# Patient Record
Sex: FEMALE | Race: WHITE | Employment: PART TIME | URBAN - METROPOLITAN AREA
[De-identification: names, ages, dates, MRNs, and addresses within clinical notes are randomized per-mention and may not be internally consistent; named-entity substitution may affect disease eponyms.]

---

## 2018-07-06 ENCOUNTER — LAB ENCOUNTER (OUTPATIENT)
Dept: LAB | Age: 33
End: 2018-07-06
Attending: EMERGENCY MEDICINE
Payer: COMMERCIAL

## 2018-07-06 ENCOUNTER — OFFICE VISIT (OUTPATIENT)
Dept: FAMILY MEDICINE CLINIC | Facility: CLINIC | Age: 33
End: 2018-07-06

## 2018-07-06 VITALS
SYSTOLIC BLOOD PRESSURE: 110 MMHG | BODY MASS INDEX: 24.1 KG/M2 | RESPIRATION RATE: 15 BRPM | WEIGHT: 126 LBS | HEIGHT: 60.5 IN | OXYGEN SATURATION: 98 % | DIASTOLIC BLOOD PRESSURE: 82 MMHG | TEMPERATURE: 98 F | HEART RATE: 85 BPM

## 2018-07-06 DIAGNOSIS — Z13.228 SCREENING FOR ENDOCRINE, NUTRITIONAL, METABOLIC AND IMMUNITY DISORDER: ICD-10-CM

## 2018-07-06 DIAGNOSIS — Z23 NEED FOR TDAP VACCINATION: ICD-10-CM

## 2018-07-06 DIAGNOSIS — M79.652 PAIN IN BOTH THIGHS: ICD-10-CM

## 2018-07-06 DIAGNOSIS — R87.618 PAP SMEAR ABNORMALITY OF CERVIX/HUMAN PAPILLOMAVIRUS (HPV) POSITIVE: ICD-10-CM

## 2018-07-06 DIAGNOSIS — Z12.4 SCREENING FOR CERVICAL CANCER: ICD-10-CM

## 2018-07-06 DIAGNOSIS — Z13.21 SCREENING FOR ENDOCRINE, NUTRITIONAL, METABOLIC AND IMMUNITY DISORDER: ICD-10-CM

## 2018-07-06 DIAGNOSIS — M79.651 PAIN IN BOTH THIGHS: ICD-10-CM

## 2018-07-06 DIAGNOSIS — Z13.0 SCREENING FOR ENDOCRINE, NUTRITIONAL, METABOLIC AND IMMUNITY DISORDER: ICD-10-CM

## 2018-07-06 DIAGNOSIS — Z13.29 SCREENING FOR ENDOCRINE, NUTRITIONAL, METABOLIC AND IMMUNITY DISORDER: ICD-10-CM

## 2018-07-06 DIAGNOSIS — R07.89 ATYPICAL CHEST PAIN: ICD-10-CM

## 2018-07-06 DIAGNOSIS — Z00.00 ENCOUNTER FOR ANNUAL PHYSICAL EXAM: Primary | ICD-10-CM

## 2018-07-06 LAB
ALBUMIN SERPL-MCNC: 3.8 G/DL (ref 3.5–4.8)
ALP LIVER SERPL-CCNC: 77 U/L (ref 37–98)
ALT SERPL-CCNC: 20 U/L (ref 14–54)
AST SERPL-CCNC: 14 U/L (ref 15–41)
BASOPHILS # BLD AUTO: 0.06 X10(3) UL (ref 0–0.1)
BASOPHILS NFR BLD AUTO: 1 %
BILIRUB SERPL-MCNC: 0.3 MG/DL (ref 0.1–2)
BILIRUB UR QL STRIP.AUTO: NEGATIVE
BUN BLD-MCNC: 14 MG/DL (ref 8–20)
C-REACTIVE PROTEIN: <0.29 MG/DL (ref ?–1)
CALCIUM BLD-MCNC: 8.6 MG/DL (ref 8.3–10.3)
CHLORIDE: 108 MMOL/L (ref 101–111)
CHOLEST SMN-MCNC: 154 MG/DL (ref ?–200)
CK: 69 IU/L (ref 26–192)
CO2: 26 MMOL/L (ref 22–32)
COLOR UR AUTO: YELLOW
CREAT BLD-MCNC: 0.66 MG/DL (ref 0.55–1.02)
EOSINOPHIL # BLD AUTO: 0.12 X10(3) UL (ref 0–0.3)
EOSINOPHIL NFR BLD AUTO: 2 %
ERYTHROCYTE [DISTWIDTH] IN BLOOD BY AUTOMATED COUNT: 12.6 % (ref 11.5–16)
GLUCOSE BLD-MCNC: 82 MG/DL (ref 70–99)
GLUCOSE UR STRIP.AUTO-MCNC: NEGATIVE MG/DL
HCT VFR BLD AUTO: 37 % (ref 34–50)
HDLC SERPL-MCNC: 65 MG/DL (ref 45–?)
HDLC SERPL: 2.37 {RATIO} (ref ?–4.44)
HGB BLD-MCNC: 12 G/DL (ref 12–16)
IMMATURE GRANULOCYTE COUNT: 0.02 X10(3) UL (ref 0–1)
IMMATURE GRANULOCYTE RATIO %: 0.3 %
KETONES UR STRIP.AUTO-MCNC: NEGATIVE MG/DL
LDLC SERPL CALC-MCNC: 80 MG/DL (ref ?–130)
LEUKOCYTE ESTERASE UR QL STRIP.AUTO: NEGATIVE
LYMPHOCYTES # BLD AUTO: 1.89 X10(3) UL (ref 0.9–4)
LYMPHOCYTES NFR BLD AUTO: 31.3 %
M PROTEIN MFR SERPL ELPH: 7.7 G/DL (ref 6.1–8.3)
MCH RBC QN AUTO: 29.3 PG (ref 27–33.2)
MCHC RBC AUTO-ENTMCNC: 32.4 G/DL (ref 31–37)
MCV RBC AUTO: 90.2 FL (ref 81–100)
MONOCYTES # BLD AUTO: 0.45 X10(3) UL (ref 0.1–1)
MONOCYTES NFR BLD AUTO: 7.5 %
NEUTROPHIL ABS PRELIM: 3.49 X10 (3) UL (ref 1.3–6.7)
NEUTROPHILS # BLD AUTO: 3.49 X10(3) UL (ref 1.3–6.7)
NEUTROPHILS NFR BLD AUTO: 57.9 %
NITRITE UR QL STRIP.AUTO: NEGATIVE
NONHDLC SERPL-MCNC: 89 MG/DL (ref ?–130)
PH UR STRIP.AUTO: 6 [PH] (ref 4.5–8)
PLATELET # BLD AUTO: 255 10(3)UL (ref 150–450)
POTASSIUM SERPL-SCNC: 3.9 MMOL/L (ref 3.6–5.1)
PROT UR STRIP.AUTO-MCNC: NEGATIVE MG/DL
RBC # BLD AUTO: 4.1 X10(6)UL (ref 3.8–5.1)
RBC UR QL AUTO: NEGATIVE
RED CELL DISTRIBUTION WIDTH-SD: 41.5 FL (ref 35.1–46.3)
RHEUMATOID FACT SERPL-ACNC: <10 IU/ML (ref ?–15)
SED RATE-ML: 13 MM/HR (ref 0–25)
SODIUM SERPL-SCNC: 141 MMOL/L (ref 136–144)
SP GR UR STRIP.AUTO: 1.02 (ref 1–1.03)
TRIGL SERPL-MCNC: 47 MG/DL (ref ?–150)
TSI SER-ACNC: 3.67 MIU/ML (ref 0.35–5.5)
URIC ACID: 3.9 MG/DL (ref 2.4–8)
UROBILINOGEN UR STRIP.AUTO-MCNC: <2 MG/DL
VLDLC SERPL CALC-MCNC: 9 MG/DL (ref 5–40)
WBC # BLD AUTO: 6 X10(3) UL (ref 4–13)

## 2018-07-06 PROCEDURE — 86200 CCP ANTIBODY: CPT | Performed by: EMERGENCY MEDICINE

## 2018-07-06 PROCEDURE — 88175 CYTOPATH C/V AUTO FLUID REDO: CPT | Performed by: EMERGENCY MEDICINE

## 2018-07-06 PROCEDURE — 99395 PREV VISIT EST AGE 18-39: CPT | Performed by: EMERGENCY MEDICINE

## 2018-07-06 PROCEDURE — 86038 ANTINUCLEAR ANTIBODIES: CPT | Performed by: EMERGENCY MEDICINE

## 2018-07-06 PROCEDURE — 87624 HPV HI-RISK TYP POOLED RSLT: CPT | Performed by: EMERGENCY MEDICINE

## 2018-07-06 PROCEDURE — 80061 LIPID PANEL: CPT | Performed by: EMERGENCY MEDICINE

## 2018-07-06 PROCEDURE — 85652 RBC SED RATE AUTOMATED: CPT | Performed by: EMERGENCY MEDICINE

## 2018-07-06 PROCEDURE — 36415 COLL VENOUS BLD VENIPUNCTURE: CPT | Performed by: EMERGENCY MEDICINE

## 2018-07-06 PROCEDURE — 90471 IMMUNIZATION ADMIN: CPT | Performed by: EMERGENCY MEDICINE

## 2018-07-06 PROCEDURE — 81003 URINALYSIS AUTO W/O SCOPE: CPT | Performed by: EMERGENCY MEDICINE

## 2018-07-06 PROCEDURE — 80050 GENERAL HEALTH PANEL: CPT | Performed by: EMERGENCY MEDICINE

## 2018-07-06 PROCEDURE — 86431 RHEUMATOID FACTOR QUANT: CPT | Performed by: EMERGENCY MEDICINE

## 2018-07-06 PROCEDURE — 99213 OFFICE O/P EST LOW 20 MIN: CPT | Performed by: EMERGENCY MEDICINE

## 2018-07-06 PROCEDURE — 84550 ASSAY OF BLOOD/URIC ACID: CPT | Performed by: EMERGENCY MEDICINE

## 2018-07-06 PROCEDURE — 90715 TDAP VACCINE 7 YRS/> IM: CPT | Performed by: EMERGENCY MEDICINE

## 2018-07-06 PROCEDURE — 86140 C-REACTIVE PROTEIN: CPT | Performed by: EMERGENCY MEDICINE

## 2018-07-06 PROCEDURE — 82550 ASSAY OF CK (CPK): CPT | Performed by: EMERGENCY MEDICINE

## 2018-07-06 NOTE — PROGRESS NOTES
Brad Riggs is a 28year old female who presents for a complete physical exam.   HPI:     Patient presents with:  Physical: Annual physical and pap        Age: 28    1First day of last menstrual period (or first year of         menstruation, if th you  had your cholesterol level checked  in the past five years? YES       e. Have you had a tetanus shot  the past 10 years? YES 2008       f. Does your house have a working smoke detector? YES        g. Do you have firearms at home? NO        h.  Have prescriptions on file. History reviewed. No pertinent past medical history. History reviewed. No pertinent surgical history.    Family History   Problem Relation Age of Onset   • Diabetes Father    • Cancer Paternal Grandmother      stomach   • Cancer P palpable masses, no axillary LAD, no nipple D/C, drainage or retractions. Abdomen: is soft,NBS, NT/ND with no HSM. No rebound or guarding. No CVA tenderness, no hernias.    exam: Speculum placed and vaginal wall visualizes without abnormalities and Liqu orders for this visit:    Encounter for annual physical exam    Screening for endocrine, nutritional, metabolic and immunity disorder  -     CBC WITH DIFFERENTIAL WITH PLATELET; Future  -     COMP METABOLIC PANEL (14); Future  -     LIPID PANEL;  Future  -

## 2018-07-06 NOTE — PATIENT INSTRUCTIONS
Thank you for choosing [address] [place] [address] Group  To Do:  FOR WAGNER DINERO WILLIAM  1. Await PAP, close follow up with Dr. Waqas Rodriguez  2. Have chest xray done and EKG   3. Have blood tests done after fasting  4. Arrange for holter monitor and 2 d echo  5.  Xray of le years   Chlamydia Sexually active women ages 25 and younger, and women at increased risk for infection Every 3 years if you're at risk or have symptoms   Depression All women in this age group At routine exams   Diabetes mellitus, type 2 Adults with no sym women in this age group who have no record of these infections or vaccines 1 or 2 doses   Meningococcal Women at increased risk for infection – talk with your healthcare provider 1 or more doses   Pneumococcal conjugate vaccine (PCV13) and pneumococcal destini [id number] [address], [address] [place] [address]. All rights reserved. This information is not intended as a substitute for professional medical care. Always follow your healthcare professional's instructions.

## 2018-07-07 ENCOUNTER — HOSPITAL ENCOUNTER (OUTPATIENT)
Dept: GENERAL RADIOLOGY | Age: 33
Discharge: HOME | End: 2018-07-07
Attending: EMERGENCY MEDICINE
Payer: COMMERCIAL

## 2018-07-07 ENCOUNTER — APPOINTMENT (OUTPATIENT)
Dept: LAB | Age: 33
End: 2018-07-07
Attending: EMERGENCY MEDICINE
Payer: COMMERCIAL

## 2018-07-07 DIAGNOSIS — M79.651 PAIN IN BOTH THIGHS: ICD-10-CM

## 2018-07-07 DIAGNOSIS — M79.652 PAIN IN BOTH THIGHS: ICD-10-CM

## 2018-07-07 DIAGNOSIS — R07.89 ATYPICAL CHEST PAIN: ICD-10-CM

## 2018-07-07 LAB — CYCLIC CITRULLINATED PEPTIDE: 11 UNITS

## 2018-07-07 PROCEDURE — 71046 X-RAY EXAM CHEST 2 VIEWS: CPT | Performed by: EMERGENCY MEDICINE

## 2018-07-07 PROCEDURE — 73552 X-RAY EXAM OF FEMUR 2/>: CPT | Performed by: EMERGENCY MEDICINE

## 2018-07-07 PROCEDURE — 93010 ELECTROCARDIOGRAM REPORT: CPT | Performed by: INTERNAL MEDICINE

## 2018-07-07 PROCEDURE — 93005 ELECTROCARDIOGRAM TRACING: CPT

## 2018-07-08 LAB — HPV I/H RISK 1 DNA SPEC QL NAA+PROBE: NEGATIVE

## 2018-07-09 ENCOUNTER — TELEPHONE (OUTPATIENT)
Dept: FAMILY MEDICINE CLINIC | Facility: CLINIC | Age: 33
End: 2018-07-09

## 2018-07-09 LAB
ANA SCREEN: NEGATIVE
ATRIAL RATE: 70 BPM
P AXIS: 59 DEGREES
P-R INTERVAL: 138 MS
Q-T INTERVAL: 398 MS
QRS DURATION: 82 MS
QTC CALCULATION (BEZET): 429 MS
R AXIS: 69 DEGREES
T AXIS: 36 DEGREES
VENTRICULAR RATE: 70 BPM

## 2018-07-09 NOTE — TELEPHONE ENCOUNTER
----- Message from Suma Payton MD sent at 7/9/2018  4:23 PM CDT -----  Normal chest x ray    XR FEMUR MIN 2 VIEWS LEFT (CPT=73552)   Order: [id number]   Status:  Final result   Visible to patient:  No (Not Released) Dx:  Pain in both thighs   Notes re

## 2018-07-09 NOTE — TELEPHONE ENCOUNTER
LVM for pt regarding results and instructions listed below. Pt instructed to call back with any further questions/concerns. Pt informed that we are still waiting on her lab results, that she will be receiving a call once those are received, as well.

## 2018-07-10 PROBLEM — M79.652 PAIN IN BOTH THIGHS: Status: ACTIVE | Noted: 2018-07-10

## 2018-07-10 PROBLEM — M79.651 PAIN IN BOTH THIGHS: Status: ACTIVE | Noted: 2018-07-10

## 2018-07-10 PROBLEM — R87.618 PAP SMEAR ABNORMALITY OF CERVIX/HUMAN PAPILLOMAVIRUS (HPV) POSITIVE: Status: ACTIVE | Noted: 2018-07-10

## 2018-07-13 ENCOUNTER — TELEPHONE (OUTPATIENT)
Dept: FAMILY MEDICINE CLINIC | Facility: CLINIC | Age: 33
End: 2018-07-13

## 2018-07-13 NOTE — TELEPHONE ENCOUNTER
VM left for patient. Advised her of normal/stable blood work and EKG. Encouraged call back with questions or concerns.

## 2018-07-13 NOTE — TELEPHONE ENCOUNTER
VM left for patient. I made her aware that her papsmear results are normal but that given her previous history of abnormal one she  Needs to follow up with OBGYN.  I gave her the contact number for  and encouraged her to call back with any questio

## 2018-07-13 NOTE — TELEPHONE ENCOUNTER
----- Message from Terra Ward MD sent at 7/13/2018  9:31 AM CDT -----  HPV neg  PAP neg  Pls make sure patient follows up with OB for + history of ABN PAP, Further recommendations per OB    Patient previously referred to Joel Beatty

## 2018-07-13 NOTE — TELEPHONE ENCOUNTER
----- Message from Cherry Forman MD sent at 7/12/2018  4:28 PM CDT -----  Stable EKG    Notes recorded by Annie Soto MD on 7/12/2018 at 4:28 PM CDT  All labs stable

## 2018-07-27 ENCOUNTER — OFFICE VISIT (OUTPATIENT)
Dept: FAMILY MEDICINE CLINIC | Facility: CLINIC | Age: 33
End: 2018-07-27
Payer: COMMERCIAL

## 2018-07-27 VITALS
RESPIRATION RATE: 15 BRPM | DIASTOLIC BLOOD PRESSURE: 62 MMHG | BODY MASS INDEX: 24 KG/M2 | WEIGHT: 125 LBS | SYSTOLIC BLOOD PRESSURE: 114 MMHG | OXYGEN SATURATION: 98 % | HEART RATE: 85 BPM

## 2018-07-27 DIAGNOSIS — M79.651 BILATERAL THIGH PAIN: ICD-10-CM

## 2018-07-27 DIAGNOSIS — R10.12 LEFT UPPER QUADRANT PAIN: Primary | ICD-10-CM

## 2018-07-27 DIAGNOSIS — M79.652 BILATERAL THIGH PAIN: ICD-10-CM

## 2018-07-27 PROCEDURE — 99214 OFFICE O/P EST MOD 30 MIN: CPT | Performed by: EMERGENCY MEDICINE

## 2018-07-27 NOTE — PATIENT INSTRUCTIONS
Thank you for choosing [facility]  To Do:  FOR WAGNER THOMAS    · Arrange for physical therapy  · May take OTC Tylenol as needed for thigh pain  [place] diet  Eat regularly and avoid prolonged fasting  Avoid aspirin or Ibuprofen or aleve or na Examples are omeprazole (Prilosec), lansoprazole (Prevacid), pantoprazole (Protonix), rabeprazole (Aciphex), and esomeprazole (Nexium). · Take an antacid 30-60 minutes after eating and at bedtime, but not at the same time as an acid blocker.   · Try not to some basic diet and lifestyle changes. The simple steps outlined below may be all you’ll need to ease discomfort. Watch what you eat  · Avoid fatty foods and spicy foods. · Eat fewer acidic foods, such as citrus and tomato-based foods.  These can increase type of medicine called H2 blockers. These are available over the counter and by prescription (for higher doses). Blocking stomach acid  In more severe cases, your healthcare provider may suggest stronger medicines such as proton pump inhibitors (PPIs).  Jazmin [place] medicines for H pylori infection, take them as directed. Take all of the medicine until it is finished or your healthcare provider tells you to stop, even if you feel better. · Your healthcare provider may recommend avoiding NSAIDs.  If you take daily aspi If tests show that H. pylori are in your stomach lining, antibiotics may be prescribed. H.pylori are a type of bacteria that can cause gastritis. Avoiding certain things  Be sure to avoid:  · Aspirin.  Avoid taking aspirin and other anti-inflammatory medic

## 2018-07-27 NOTE — PROGRESS NOTES
Chief Complaint:   Patient presents with:  Lab Results: Follow up     HPI:   This is a 28year old female     FOLLOW UP LABS  Worked up for atypical CP. States that the pain is better. Pain only occurs once a month and resolves spontabeously.  Chest Xray body mass index is 24.01 kg/m² as calculated from the following:    Height as of 7/6/18: 60.5\". Weight as of this encounter: 125 lb. Vital signs reviewed. Appears stated age, well groomed.   GENERAL: well developed, well nourished, well hydrated, no di [facility] REMOVED]    Clinical Information Z12.4 Screening For Cervical Cancer. R87.89 Pap Smear Abnormality Of   Cervix/Human Papillomavirus (Hpv) Positive.    [FORMATTING REMOVED]    Reason for testing Screening    Last PAP Result Low Grade    PAP Hist Cholesterol 65 >45 mg/dL   LDL Cholesterol 80 <130 mg/dL   VLDL 9 5 - 40 mg/dL   Chol/HDL Ratio 2.37 <4.44   Non HDL Chol 89 <130 mg/dL   -TSH W REFLEX TO FREE T4   Collection Time: 07/06/18  9:48 AM   Result Value Ref Range   TSH 3.670 0.350 - 5.500 mIU/m fL   MCH 29.3 27.0 - 33.2 pg   MCHC 32.4 31.0 - 37.0 g/dL   RDW 12.6 11.5 - 16.0 %   RDW-SD 41.5 35.1 - 46.3 fL   Neutrophil Absolute Prelim 3.49 1.30 - 6.70 x10 (3) uL   Neutrophil Absolute 3.49 1.30 - 6.70 x10(3) uL   Lymphocyte Absolute 1.89 0.90 - 4.00 no skipping meals  Take over the counter PREVACID 20 mg daily for 6-8 weeks  May also take over the counter Tums Maalox or Mylanta as needed for pain  To ER if worse like worsening of pain, fever, bloody stool, persistent vommitting, weakness etc  Follow u

## 2018-12-21 ENCOUNTER — TELEPHONE (OUTPATIENT)
Dept: FAMILY MEDICINE CLINIC | Facility: CLINIC | Age: 33
End: 2018-12-21

## 2018-12-21 DIAGNOSIS — R87.612 LOW GRADE SQUAMOUS INTRAEPITHELIAL LESION (LGSIL) ON CERVICAL PAP SMEAR: Primary | ICD-10-CM

## 2018-12-21 NOTE — TELEPHONE ENCOUNTER
Patient needs a referral to see Dr. May Rodriguez with obstetics & Gynecology. Please advise patient with information once completed.

## 2018-12-21 NOTE — TELEPHONE ENCOUNTER
Spoke with patient informed her referral was put in the system and that she can call Dr Keyshawn Brothers to make the appointment. Patient said thank you.

## 2020-12-21 ENCOUNTER — OFFICE VISIT (OUTPATIENT)
Dept: FAMILY MEDICINE CLINIC | Facility: CLINIC | Age: 35
End: 2020-12-21
Payer: COMMERCIAL

## 2020-12-21 VITALS
HEIGHT: 61 IN | BODY MASS INDEX: 26.62 KG/M2 | OXYGEN SATURATION: 98 % | SYSTOLIC BLOOD PRESSURE: 128 MMHG | TEMPERATURE: 98 F | DIASTOLIC BLOOD PRESSURE: 78 MMHG | HEART RATE: 115 BPM | WEIGHT: 141 LBS | RESPIRATION RATE: 15 BRPM

## 2020-12-21 DIAGNOSIS — H43.393 FLOATERS, BILATERAL: ICD-10-CM

## 2020-12-21 DIAGNOSIS — Z00.00 ENCOUNTER FOR ANNUAL PHYSICAL EXAMINATION EXCLUDING GYNECOLOGICAL EXAMINATION IN A PATIENT OLDER THAN 17 YEARS: Primary | ICD-10-CM

## 2020-12-21 DIAGNOSIS — Z00.00 LABORATORY EXAMINATION ORDERED AS PART OF A ROUTINE GENERAL MEDICAL EXAMINATION: ICD-10-CM

## 2020-12-21 PROBLEM — M79.652 PAIN IN BOTH THIGHS: Status: RESOLVED | Noted: 2018-07-10 | Resolved: 2020-12-21

## 2020-12-21 PROBLEM — R10.12 LEFT UPPER QUADRANT PAIN: Status: RESOLVED | Noted: 2018-07-27 | Resolved: 2020-12-21

## 2020-12-21 PROBLEM — M79.651 PAIN IN BOTH THIGHS: Status: RESOLVED | Noted: 2018-07-10 | Resolved: 2020-12-21

## 2020-12-21 PROCEDURE — 3078F DIAST BP <80 MM HG: CPT | Performed by: EMERGENCY MEDICINE

## 2020-12-21 PROCEDURE — 99395 PREV VISIT EST AGE 18-39: CPT | Performed by: EMERGENCY MEDICINE

## 2020-12-21 PROCEDURE — 3008F BODY MASS INDEX DOCD: CPT | Performed by: EMERGENCY MEDICINE

## 2020-12-21 PROCEDURE — 3074F SYST BP LT 130 MM HG: CPT | Performed by: EMERGENCY MEDICINE

## 2020-12-21 NOTE — PATIENT INSTRUCTIONS
Thank you for choosing [address] [place] [address] Group  To Do:  FOR WAGNER THOMAS        1. Arrange follows up with ophthalmology, Dr. Crystal Howard  2. Follow up yearly or as needed  3. Have blood tests done after fasting. Dr. Kaz Clark.   Fabio Cervical cancer Women ages 24 and older Women between ages 24 and 34 should have a Pap test every 3 years; women between ages 27 and 72 are advised to have a Pap test plus an HPV test every 5 years   Chlamydia Sexually active women ages 25 and younger, and Human papillomavirus (HPV) All women in this age group up to age 32 3 doses; the second dose should be given 1 to 2 months after the first dose and the third dose given 6 months after the first dose   Influenza (flu) All women in this age group Once a year 1According to the ACS, women ages 21 to 44 years should have a clinical breast exam (CBE) as part of their routine health exam every 3 years, and breast self-exams are an option for women starting in their 20s. However, the  USPSTF does not recommend CBE.

## 2020-12-21 NOTE — PROGRESS NOTES
Jonathan Dixon is a 28year old female who presents for a complete physical exam.   HPI:     Patient presents with:  Physical: Annual physical        Age: 28    1First day of last menstrual period (or first year of         menstruation, if through me wear seat belts? YES       d. If over 27years old, have you  had your cholesterol level checked  in the past five years? YES       e. Have you had a tetanus shot  the past 10 years? YES 2018       f.  Does you Smoking status: Never Smoker      Smokeless tobacco: Never Used    Alcohol use: No    Drug use: No           REVIEW OF SYSTEMS:   GENERAL HEALTH: feels well, no fatigue.   SKIN: denies any unusual skin lesions or rashes  EYES: no visual complaints or defici edema.  MS: Normal muscles tones, no joints abnormalities. SKIN: Normal color, turgor, no lesions, rashes or wounds. PSYCH: normal affect and mood. ASSESSMENT AND PLAN:       1.  Encounter for annual physical examination excluding gynecological exami fasting.         FOLLOW UP:  yearly

## 2020-12-24 ENCOUNTER — LAB ENCOUNTER (OUTPATIENT)
Dept: LAB | Age: 35
End: 2020-12-24
Attending: EMERGENCY MEDICINE
Payer: COMMERCIAL

## 2020-12-24 DIAGNOSIS — Z00.00 LABORATORY EXAMINATION ORDERED AS PART OF A ROUTINE GENERAL MEDICAL EXAMINATION: ICD-10-CM

## 2020-12-24 PROCEDURE — 80061 LIPID PANEL: CPT | Performed by: EMERGENCY MEDICINE

## 2020-12-24 PROCEDURE — 80050 GENERAL HEALTH PANEL: CPT | Performed by: EMERGENCY MEDICINE

## 2020-12-24 PROCEDURE — 36415 COLL VENOUS BLD VENIPUNCTURE: CPT | Performed by: EMERGENCY MEDICINE

## 2020-12-30 ENCOUNTER — TELEPHONE (OUTPATIENT)
Dept: FAMILY MEDICINE CLINIC | Facility: CLINIC | Age: 35
End: 2020-12-30

## 2021-09-04 ENCOUNTER — OFFICE VISIT (OUTPATIENT)
Dept: FAMILY MEDICINE CLINIC | Facility: CLINIC | Age: 36
End: 2021-09-04
Payer: COMMERCIAL

## 2021-09-04 VITALS
RESPIRATION RATE: 16 BRPM | DIASTOLIC BLOOD PRESSURE: 76 MMHG | WEIGHT: 140 LBS | SYSTOLIC BLOOD PRESSURE: 118 MMHG | HEART RATE: 88 BPM | HEIGHT: 61 IN | BODY MASS INDEX: 26.43 KG/M2 | OXYGEN SATURATION: 99 % | TEMPERATURE: 98 F

## 2021-09-04 DIAGNOSIS — R39.9 UTI SYMPTOMS: Primary | ICD-10-CM

## 2021-09-04 DIAGNOSIS — R30.0 DYSURIA: ICD-10-CM

## 2021-09-04 LAB
BILIRUBIN: NEGATIVE
GLUCOSE (URINE DIPSTICK): NEGATIVE MG/DL
KETONES (URINE DIPSTICK): NEGATIVE MG/DL
MULTISTIX LOT#: ABNORMAL NUMERIC
NITRITE, URINE: NEGATIVE
PH, URINE: 6 (ref 4.5–8)
PROTEIN (URINE DIPSTICK): NEGATIVE MG/DL
SPECIFIC GRAVITY: 1.01 (ref 1–1.03)
URINE-COLOR: YELLOW
UROBILINOGEN,SEMI-QN: 0.2 MG/DL (ref 0–1.9)

## 2021-09-04 PROCEDURE — 81003 URINALYSIS AUTO W/O SCOPE: CPT | Performed by: NURSE PRACTITIONER

## 2021-09-04 PROCEDURE — 3078F DIAST BP <80 MM HG: CPT | Performed by: NURSE PRACTITIONER

## 2021-09-04 PROCEDURE — 3008F BODY MASS INDEX DOCD: CPT | Performed by: NURSE PRACTITIONER

## 2021-09-04 PROCEDURE — 87086 URINE CULTURE/COLONY COUNT: CPT | Performed by: NURSE PRACTITIONER

## 2021-09-04 PROCEDURE — 99213 OFFICE O/P EST LOW 20 MIN: CPT | Performed by: NURSE PRACTITIONER

## 2021-09-04 PROCEDURE — 3074F SYST BP LT 130 MM HG: CPT | Performed by: NURSE PRACTITIONER

## 2021-09-04 RX ORDER — NITROFURANTOIN 25; 75 MG/1; MG/1
100 CAPSULE ORAL 2 TIMES DAILY
Qty: 10 CAPSULE | Refills: 0 | Status: SHIPPED | OUTPATIENT
Start: 2021-09-04 | End: 2021-12-08 | Stop reason: ALTCHOICE

## 2021-09-04 NOTE — PROGRESS NOTES
CHIEF COMPLAINT:   Patient presents with:  UTI: x1 week. HPI:   Rizwana Mckoy is a 28year old female who presents with symptoms of UTI. Patient reporting symptoms of frequency, burning with urination, urgency and hesitancy  for 6 days.   Sympto Nitrite Urine Negative Negative    Leukocyte Esterase Urine Small (A) Negative    APPEARANCE Cloudy Clear    Color Urine Yellow Yellow    Multistix Lot# [id number] Numeric    Multistix Expiration Date 07/31/2022 Date         ASSESSMENT AND PLAN:   Lizet Pabon

## 2021-09-04 NOTE — PATIENT INSTRUCTIONS
· Complete full course of antibiotics. · Over the counter Tylenol (acetaminophen) or Advil/Motrin (ibuprofen) can be taken according to package instructions as needed for pain/discomfort. · Follow up in 2-3 days if symptoms do not improve or worsen.     · urinating  · Having to urinate more often than normal  · Urgent need to urinate  · Only a small amount of urine comes out  · Blood in urine  · Belly (abdominal) discomfort. This is often in the lower belly above the pubic bone.   · Cloudy urine  · Strong- o bleeding, or are taking blood-thinner medicines. · If you are given phenazopydridine to reduce burning with urination, it will make your urine a bright orange color. This can stain clothing.   Care and prevention  These self-care steps can help prevent fut better after 3 days of treatment  · Back or belly pain that gets worse  · Repeated vomiting, or unable to keep medicine down  · Weakness or dizziness  · Vaginal discharge  · Pain, redness, or swelling in the outer vaginal area (labia)  StayWell last review

## 2021-12-08 ENCOUNTER — OFFICE VISIT (OUTPATIENT)
Dept: FAMILY MEDICINE CLINIC | Facility: CLINIC | Age: 36
End: 2021-12-08
Payer: COMMERCIAL

## 2021-12-08 VITALS
HEART RATE: 111 BPM | SYSTOLIC BLOOD PRESSURE: 102 MMHG | TEMPERATURE: 99 F | BODY MASS INDEX: 26.43 KG/M2 | HEIGHT: 61 IN | DIASTOLIC BLOOD PRESSURE: 68 MMHG | WEIGHT: 140 LBS | RESPIRATION RATE: 16 BRPM | OXYGEN SATURATION: 97 %

## 2021-12-08 DIAGNOSIS — U07.1 COVID-19: Primary | ICD-10-CM

## 2021-12-08 PROCEDURE — 3074F SYST BP LT 130 MM HG: CPT | Performed by: NURSE PRACTITIONER

## 2021-12-08 PROCEDURE — U0002 COVID-19 LAB TEST NON-CDC: HCPCS | Performed by: NURSE PRACTITIONER

## 2021-12-08 PROCEDURE — 3008F BODY MASS INDEX DOCD: CPT | Performed by: NURSE PRACTITIONER

## 2021-12-08 PROCEDURE — 99213 OFFICE O/P EST LOW 20 MIN: CPT | Performed by: NURSE PRACTITIONER

## 2021-12-08 PROCEDURE — 3078F DIAST BP <80 MM HG: CPT | Performed by: NURSE PRACTITIONER

## 2021-12-08 NOTE — PROGRESS NOTES
CHIEF COMPLAINT:   Patient presents with:  Fever: and dizzy x 5 days       HPI:   Merna Vasquez is a 39year old female who presents for upper respiratory symptoms for  5 days. Patient reports dizziness off and on.   Associated symptoms include headach auscultation bilaterally; good air movement. Breathing is non labored. CARDIO: RRR without murmur  EXTREMITIES: no cyanosis, clubbing or edema  LYMPH:  No cervical lymphadenopathy.         Results for orders placed or performed in visit on 12/08/21   [facility] severe. Some people have no symptoms. These viruses are also found in some animals. All 50 states in the [place] have reported cases of COVID-19. Many areas report \"community spread\" of COVID-19.  This means the source of the illness is not known. Like chema check your symptoms with the CDC’s Coronavirus Self-. What are possible complications from [id number]? In many cases, this virus can cause infection (pneumonia) in both lungs. In some cases, this can cause death.  Certain people are at higher risk f depends on the availability of testing in your area, and how sick you are. Follow all instructions from your healthcare provider. Guidelines for testing may change as more information about the virus becomes available.  Diagnostic tests are used to find a c has previously been infected with the virus and may now have antibodies such as SARS AB IgG in their blood to give some immunity. The accuracy and availability of antibody tests vary.  An antibody test may not be able to show if you have a current infection several weeks after the first. Getting the COVID-19 vaccine is important to help prevent the spread of COVID-19 and its variants. The most proven treatments right now are those to help your body while it fights the virus.  This is known as supportive care only for people who need to be treated in the hospital. In certain cases, it may also be used for people younger than 12 years or who weigh less than about 88 pounds (40 kgs).   Research continues on other therapies that are still experimental. These includ have COVID-19 for a total of 15 minutes or more. This could be multiple short encounters that add up to at least 15 minutes over a 24-hour period. Recent studies suggest that COVID-19 may be spread by people who are not showing symptoms.    Date last modifi

## 2021-12-08 NOTE — PATIENT INSTRUCTIONS
Rest and fluids  Tylenol as packet insert   May try: over the counter   Vitamin D as package  Vitamin C as package   Zinc as package        Pedialyte or Oral rehydration fluids  If not able to drink, go to ER for IV fluids    May continue OTC cough medicat website at www.cdc.gov/coronavirus/2019-ncov. Or call 897-UBG-CLHT (746-185-8875). What are the symptoms of COVID-19? Some people have no symptoms or mild symptoms. Symptoms can also vary from person to person.  As experts learn more about COVID-19, carlos 100.4°F (38.0°C) for more than 24 hours and a positive SARS-CoV-2 test or exposure to the virus in the last 4 weeks  · Inflammation in at least 2 organs such as the heart, lungs, or kidneys with lab tests that show inflammation  · No other diagnoses beside Depending on the test, some results are back within an hour. Positive results are highly accurate, but false positives can happen, especially in places where few people have the virus.  Antigen tests are more likely to miss a COVID-19 infection than a viral 12). Talk with your healthcare provider about your risks and which vaccine may be best for you. Pregnant or breastfeeding people may choose to be vaccinated.  Expert groups, including ACOG and the CDC, advise pregnant or breastfeeding people to talk with This is called prone positioning. It helps increase the amount of oxygen you get to your lungs.  Follow your healthcare team's instructions on position changes while you're in the hospital. Also follow their discharge advice on the best positions to help yo stay. This includes people who are 65 years and older and people with certain chronic conditions. Monoclonal antibody therapy is not approved for people who:  ? Are in the hospital with COVID-19, or  ? Need oxygen therapy for COVID-19,  or  ?  Need oxygen t

## 2022-07-20 ENCOUNTER — OFFICE VISIT (OUTPATIENT)
Dept: FAMILY MEDICINE CLINIC | Facility: CLINIC | Age: 37
End: 2022-07-20
Payer: COMMERCIAL

## 2022-07-20 VITALS
OXYGEN SATURATION: 98 % | BODY MASS INDEX: 26.43 KG/M2 | HEART RATE: 122 BPM | HEIGHT: 61 IN | RESPIRATION RATE: 18 BRPM | DIASTOLIC BLOOD PRESSURE: 80 MMHG | TEMPERATURE: 98 F | SYSTOLIC BLOOD PRESSURE: 118 MMHG | WEIGHT: 140 LBS

## 2022-07-20 DIAGNOSIS — J00 NASOPHARYNGITIS: Primary | ICD-10-CM

## 2022-07-20 LAB
CONTROL LINE PRESENT WITH A CLEAR BACKGROUND (YES/NO): YES YES/NO
KIT LOT #: NORMAL NUMERIC
STREP GRP A CUL-SCR: NEGATIVE

## 2022-07-20 PROCEDURE — 3074F SYST BP LT 130 MM HG: CPT | Performed by: NURSE PRACTITIONER

## 2022-07-20 PROCEDURE — 3079F DIAST BP 80-89 MM HG: CPT | Performed by: NURSE PRACTITIONER

## 2022-07-20 PROCEDURE — 87880 STREP A ASSAY W/OPTIC: CPT | Performed by: NURSE PRACTITIONER

## 2022-07-20 PROCEDURE — 99213 OFFICE O/P EST LOW 20 MIN: CPT | Performed by: NURSE PRACTITIONER

## 2022-07-20 PROCEDURE — 3008F BODY MASS INDEX DOCD: CPT | Performed by: NURSE PRACTITIONER

## 2022-07-21 ENCOUNTER — TELEPHONE (OUTPATIENT)
Dept: FAMILY MEDICINE CLINIC | Facility: CLINIC | Age: 37
End: 2022-07-21

## 2022-07-21 LAB — SARS-COV-2 RNA RESP QL NAA+PROBE: DETECTED

## 2022-12-12 ENCOUNTER — OFFICE VISIT (OUTPATIENT)
Dept: FAMILY MEDICINE CLINIC | Facility: CLINIC | Age: 37
End: 2022-12-12
Payer: COMMERCIAL

## 2022-12-12 VITALS
HEART RATE: 89 BPM | BODY MASS INDEX: 29.45 KG/M2 | OXYGEN SATURATION: 99 % | RESPIRATION RATE: 18 BRPM | WEIGHT: 150 LBS | HEIGHT: 60 IN | DIASTOLIC BLOOD PRESSURE: 70 MMHG | SYSTOLIC BLOOD PRESSURE: 110 MMHG

## 2022-12-12 DIAGNOSIS — Z00.00 ENCOUNTER FOR ANNUAL PHYSICAL EXAMINATION EXCLUDING GYNECOLOGICAL EXAMINATION IN A PATIENT OLDER THAN 17 YEARS: Primary | ICD-10-CM

## 2022-12-12 DIAGNOSIS — Z00.00 LABORATORY EXAM ORDERED AS PART OF ROUTINE GENERAL MEDICAL EXAMINATION: ICD-10-CM

## 2022-12-12 DIAGNOSIS — Z87.19 HISTORY OF RECTAL BLEEDING: ICD-10-CM

## 2022-12-12 PROCEDURE — 99395 PREV VISIT EST AGE 18-39: CPT | Performed by: EMERGENCY MEDICINE

## 2022-12-12 PROCEDURE — 3008F BODY MASS INDEX DOCD: CPT | Performed by: EMERGENCY MEDICINE

## 2022-12-12 PROCEDURE — 3074F SYST BP LT 130 MM HG: CPT | Performed by: EMERGENCY MEDICINE

## 2022-12-12 PROCEDURE — 3078F DIAST BP <80 MM HG: CPT | Performed by: EMERGENCY MEDICINE

## 2022-12-22 ENCOUNTER — LAB ENCOUNTER (OUTPATIENT)
Dept: LAB | Age: 37
End: 2022-12-22
Attending: EMERGENCY MEDICINE
Payer: COMMERCIAL

## 2022-12-22 DIAGNOSIS — Z00.00 LABORATORY EXAM ORDERED AS PART OF ROUTINE GENERAL MEDICAL EXAMINATION: ICD-10-CM

## 2022-12-22 LAB
ALBUMIN SERPL-MCNC: 3.7 G/DL (ref 3.4–5)
ALBUMIN/GLOB SERPL: 1 {RATIO} (ref 1–2)
ALP LIVER SERPL-CCNC: 79 U/L
ALT SERPL-CCNC: 22 U/L
ANION GAP SERPL CALC-SCNC: 0 MMOL/L (ref 0–18)
AST SERPL-CCNC: 9 U/L (ref 15–37)
BASOPHILS # BLD AUTO: 0.05 X10(3) UL (ref 0–0.2)
BASOPHILS NFR BLD AUTO: 0.8 %
BILIRUB SERPL-MCNC: 0.4 MG/DL (ref 0.1–2)
BUN BLD-MCNC: 11 MG/DL (ref 7–18)
CALCIUM BLD-MCNC: 8.9 MG/DL (ref 8.5–10.1)
CHLORIDE SERPL-SCNC: 110 MMOL/L (ref 98–112)
CHOLEST SERPL-MCNC: 142 MG/DL (ref ?–200)
CO2 SERPL-SCNC: 25 MMOL/L (ref 21–32)
CREAT BLD-MCNC: 0.79 MG/DL
EOSINOPHIL # BLD AUTO: 0.12 X10(3) UL (ref 0–0.7)
EOSINOPHIL NFR BLD AUTO: 1.9 %
ERYTHROCYTE [DISTWIDTH] IN BLOOD BY AUTOMATED COUNT: 12.5 %
FASTING PATIENT LIPID ANSWER: YES
FASTING STATUS PATIENT QL REPORTED: YES
GFR SERPLBLD BASED ON 1.73 SQ M-ARVRAT: 99 ML/MIN/1.73M2 (ref 60–?)
GLOBULIN PLAS-MCNC: 3.6 G/DL (ref 2.8–4.4)
GLUCOSE BLD-MCNC: 98 MG/DL (ref 70–99)
HCT VFR BLD AUTO: 39.9 %
HDLC SERPL-MCNC: 54 MG/DL (ref 40–59)
HGB BLD-MCNC: 13 G/DL
IMM GRANULOCYTES # BLD AUTO: 0.02 X10(3) UL (ref 0–1)
IMM GRANULOCYTES NFR BLD: 0.3 %
LDLC SERPL CALC-MCNC: 74 MG/DL (ref ?–100)
LYMPHOCYTES # BLD AUTO: 2.16 X10(3) UL (ref 1–4)
LYMPHOCYTES NFR BLD AUTO: 33.9 %
MCH RBC QN AUTO: 30.7 PG (ref 26–34)
MCHC RBC AUTO-ENTMCNC: 32.6 G/DL (ref 31–37)
MCV RBC AUTO: 94.1 FL
MONOCYTES # BLD AUTO: 0.47 X10(3) UL (ref 0.1–1)
MONOCYTES NFR BLD AUTO: 7.4 %
NEUTROPHILS # BLD AUTO: 3.56 X10 (3) UL (ref 1.5–7.7)
NEUTROPHILS # BLD AUTO: 3.56 X10(3) UL (ref 1.5–7.7)
NEUTROPHILS NFR BLD AUTO: 55.7 %
NONHDLC SERPL-MCNC: 88 MG/DL (ref ?–130)
OSMOLALITY SERPL CALC.SUM OF ELEC: 279 MOSM/KG (ref 275–295)
PLATELET # BLD AUTO: 318 10(3)UL (ref 150–450)
POTASSIUM SERPL-SCNC: 3.9 MMOL/L (ref 3.5–5.1)
PROT SERPL-MCNC: 7.3 G/DL (ref 6.4–8.2)
RBC # BLD AUTO: 4.24 X10(6)UL
SODIUM SERPL-SCNC: 135 MMOL/L (ref 136–145)
TRIGL SERPL-MCNC: 70 MG/DL (ref 30–149)
TSI SER-ACNC: 1.82 MIU/ML (ref 0.36–3.74)
VLDLC SERPL CALC-MCNC: 11 MG/DL (ref 0–30)
WBC # BLD AUTO: 6.4 X10(3) UL (ref 4–11)

## 2022-12-22 PROCEDURE — 80061 LIPID PANEL: CPT | Performed by: EMERGENCY MEDICINE

## 2022-12-22 PROCEDURE — 80050 GENERAL HEALTH PANEL: CPT | Performed by: EMERGENCY MEDICINE

## 2023-01-09 ENCOUNTER — TELEPHONE (OUTPATIENT)
Dept: FAMILY MEDICINE CLINIC | Facility: CLINIC | Age: 38
End: 2023-01-09

## 2023-01-09 NOTE — TELEPHONE ENCOUNTER
Juanita Monday,  I have reviewed your test results. Your blood tests show no significant abnormalities. Please let me know if you have any questions.   Fany Pierre MD   Written by Bull Castillo MD on 12/27/2022 11:52 PM CST

## 2023-01-09 NOTE — TELEPHONE ENCOUNTER
Patient is calling on the status of her recent Lab results? Would like a call back from a Nurse to go over them.

## 2023-12-29 ENCOUNTER — OFFICE VISIT (OUTPATIENT)
Dept: FAMILY MEDICINE CLINIC | Facility: CLINIC | Age: 38
End: 2023-12-29
Payer: COMMERCIAL

## 2023-12-29 ENCOUNTER — LAB ENCOUNTER (OUTPATIENT)
Dept: LAB | Age: 38
End: 2023-12-29
Attending: EMERGENCY MEDICINE
Payer: COMMERCIAL

## 2023-12-29 VITALS
DIASTOLIC BLOOD PRESSURE: 66 MMHG | WEIGHT: 153 LBS | HEIGHT: 60 IN | RESPIRATION RATE: 21 BRPM | HEART RATE: 85 BPM | BODY MASS INDEX: 30.04 KG/M2 | SYSTOLIC BLOOD PRESSURE: 116 MMHG | OXYGEN SATURATION: 97 %

## 2023-12-29 DIAGNOSIS — M77.12 LEFT TENNIS ELBOW: ICD-10-CM

## 2023-12-29 DIAGNOSIS — Z00.00 LABORATORY EXAMINATION ORDERED AS PART OF A ROUTINE GENERAL MEDICAL EXAMINATION: ICD-10-CM

## 2023-12-29 DIAGNOSIS — Z00.00 ENCOUNTER FOR ANNUAL PHYSICAL EXAMINATION EXCLUDING GYNECOLOGICAL EXAMINATION IN A PATIENT OLDER THAN 17 YEARS: Primary | ICD-10-CM

## 2023-12-29 DIAGNOSIS — N39.3 STRESS INCONTINENCE: ICD-10-CM

## 2023-12-29 LAB
ALBUMIN SERPL-MCNC: 3.7 G/DL (ref 3.4–5)
ALBUMIN/GLOB SERPL: 1.1 {RATIO} (ref 1–2)
ALP LIVER SERPL-CCNC: 79 U/L
ALT SERPL-CCNC: 30 U/L
ANION GAP SERPL CALC-SCNC: 4 MMOL/L (ref 0–18)
AST SERPL-CCNC: 17 U/L (ref 15–37)
BASOPHILS # BLD AUTO: 0.09 X10(3) UL (ref 0–0.2)
BASOPHILS NFR BLD AUTO: 1.4 %
BILIRUB SERPL-MCNC: 0.3 MG/DL (ref 0.1–2)
BUN BLD-MCNC: 10 MG/DL (ref 9–23)
CALCIUM BLD-MCNC: 8.7 MG/DL (ref 8.5–10.1)
CHLORIDE SERPL-SCNC: 109 MMOL/L (ref 98–112)
CHOLEST SERPL-MCNC: 176 MG/DL (ref ?–200)
CO2 SERPL-SCNC: 25 MMOL/L (ref 21–32)
CREAT BLD-MCNC: 0.73 MG/DL
EGFRCR SERPLBLD CKD-EPI 2021: 108 ML/MIN/1.73M2 (ref 60–?)
EOSINOPHIL # BLD AUTO: 0.15 X10(3) UL (ref 0–0.7)
EOSINOPHIL NFR BLD AUTO: 2.3 %
ERYTHROCYTE [DISTWIDTH] IN BLOOD BY AUTOMATED COUNT: 13.1 %
FASTING PATIENT LIPID ANSWER: YES
FASTING STATUS PATIENT QL REPORTED: YES
GLOBULIN PLAS-MCNC: 3.5 G/DL (ref 2.8–4.4)
GLUCOSE BLD-MCNC: 98 MG/DL (ref 70–99)
HCT VFR BLD AUTO: 40.3 %
HDLC SERPL-MCNC: 60 MG/DL (ref 40–59)
HGB BLD-MCNC: 12.9 G/DL
IMM GRANULOCYTES # BLD AUTO: 0.03 X10(3) UL (ref 0–1)
IMM GRANULOCYTES NFR BLD: 0.5 %
LDLC SERPL CALC-MCNC: 99 MG/DL (ref ?–100)
LYMPHOCYTES # BLD AUTO: 2.28 X10(3) UL (ref 1–4)
LYMPHOCYTES NFR BLD AUTO: 34.7 %
MCH RBC QN AUTO: 29.1 PG (ref 26–34)
MCHC RBC AUTO-ENTMCNC: 32 G/DL (ref 31–37)
MCV RBC AUTO: 91 FL
MONOCYTES # BLD AUTO: 0.47 X10(3) UL (ref 0.1–1)
MONOCYTES NFR BLD AUTO: 7.1 %
NEUTROPHILS # BLD AUTO: 3.56 X10 (3) UL (ref 1.5–7.7)
NEUTROPHILS # BLD AUTO: 3.56 X10(3) UL (ref 1.5–7.7)
NEUTROPHILS NFR BLD AUTO: 54 %
NONHDLC SERPL-MCNC: 116 MG/DL (ref ?–130)
OSMOLALITY SERPL CALC.SUM OF ELEC: 285 MOSM/KG (ref 275–295)
PLATELET # BLD AUTO: 313 10(3)UL (ref 150–450)
POTASSIUM SERPL-SCNC: 4.1 MMOL/L (ref 3.5–5.1)
PROT SERPL-MCNC: 7.2 G/DL (ref 6.4–8.2)
RBC # BLD AUTO: 4.43 X10(6)UL
SODIUM SERPL-SCNC: 138 MMOL/L (ref 136–145)
TRIGL SERPL-MCNC: 94 MG/DL (ref 30–149)
TSI SER-ACNC: 1.6 MIU/ML (ref 0.36–3.74)
VLDLC SERPL CALC-MCNC: 16 MG/DL (ref 0–30)
WBC # BLD AUTO: 6.6 X10(3) UL (ref 4–11)

## 2023-12-29 PROCEDURE — 99395 PREV VISIT EST AGE 18-39: CPT | Performed by: EMERGENCY MEDICINE

## 2023-12-29 PROCEDURE — 3078F DIAST BP <80 MM HG: CPT | Performed by: EMERGENCY MEDICINE

## 2023-12-29 PROCEDURE — 3074F SYST BP LT 130 MM HG: CPT | Performed by: EMERGENCY MEDICINE

## 2023-12-29 PROCEDURE — 99213 OFFICE O/P EST LOW 20 MIN: CPT | Performed by: EMERGENCY MEDICINE

## 2023-12-29 PROCEDURE — 80050 GENERAL HEALTH PANEL: CPT | Performed by: EMERGENCY MEDICINE

## 2023-12-29 PROCEDURE — 80061 LIPID PANEL: CPT | Performed by: EMERGENCY MEDICINE

## 2023-12-29 PROCEDURE — 3008F BODY MASS INDEX DOCD: CPT | Performed by: EMERGENCY MEDICINE

## 2024-02-15 ENCOUNTER — TELEPHONE (OUTPATIENT)
Dept: PHYSICAL THERAPY | Facility: HOSPITAL | Age: 39
End: 2024-02-15

## 2024-02-19 ENCOUNTER — OFFICE VISIT (OUTPATIENT)
Dept: PHYSICAL THERAPY | Age: 39
End: 2024-02-19
Attending: EMERGENCY MEDICINE
Payer: COMMERCIAL

## 2024-02-19 DIAGNOSIS — M77.12 LEFT TENNIS ELBOW: Primary | ICD-10-CM

## 2024-02-19 PROCEDURE — 97161 PT EVAL LOW COMPLEX 20 MIN: CPT | Performed by: PHYSICAL THERAPIST

## 2024-02-19 PROCEDURE — 97110 THERAPEUTIC EXERCISES: CPT | Performed by: PHYSICAL THERAPIST

## 2024-02-19 NOTE — PROGRESS NOTES
UE EVALUATION:     Diagnosis:   Left tennis elbow (M77.12)       Referring Provider: Edwin  Date of Evaluation:    2/19/2024    Precautions:  None Next MD visit:   none scheduled  Date of Surgery: n/a     PATIENT SUMMARY   Willow Hernandez is a 38 year old female who presents to therapy today with complaints of left elbow pain that has been present since November 2023.  There was no injury, she woke with the pain.  Pain is increased with lifting and grasping. Turing in bed also increases pain.    She denies numbness/tingling   Pt describes pain level current 6/10, at best 6/10, at worst 8/10.   Current functional limitations include difficulty with sweeping, difficulty/lifting carrying. Pain is especially notable when she is washing pots     Willow describes prior level of function as independent with ADLS. She is a homemaker. She does not do any regular exercises. Pt goals include Feel no pain and do the things I used to do .  Past medical history was reviewed with Willow. Significant findings include: None reported     ASSESSMENT  Willow presents to physical therapy evaluation with primary c/o left elbow pain. The results of the objective tests and measures show left elbow pain with grasping/resisted wrist extension.Pain with , reduced left UE   strength.  There is pain with initiating elbow flexion from extension. Pain is reduced with with scap retraction. There does appear to be a cervical component as posture impacts pain. .  Functional deficits include but are not limited to difficulty performing household chores.  Signs and symptoms are consistent with diagnosis of lateral epicondylitis, with question of radicular symptoms. Pt and PT discussed evaluation findings, pathology, POC and HEP.  Pt voiced understanding and performs HEP correctly without reported pain. Skilled Physical Therapy is medically necessary to address the above impairments and reach functional goals.     OBJECTIVE:    Observation/Posture: FH  Palpation: Pain present left UT, scalenes, left wrist extensor muscle belly,   Cervical Screen: ROM WNL, right rotation provokes left UT pain. Spurling's negative. Slumped sitting - left elbow pain     AROM: (* denotes performed with pain)  Bilateral UE ROM WNL, left elbow pain with Apley's ER and IR   Pain initiating elbow flexion from extension in supine, cues for scap retraction prior to motion reduce pain  Accessory motion: Mobility is good, no change in symptoms    Flexibility: bilat pec tightness, shoulder proraction     Strength/MMT: (* denotes performed with pain)  Bilateral UE strength = 5/5 with exception of left supination 4/5*, bilat LT 3+/5 left*, bilat MT 4/5 - left side elbow pain reduced with cues to relax wrist. : right = 50, left 20 pounds*. Wall push ups  - good   Special tests:   Spurling's - negative, slump sitting - increased left UE symptoms     Today’s Treatment and Response:   Pt education was provided on exam findings, treatment diagnosis, treatment plan, expectations, and prognosis. Pt was also provided recommendations for tennis elbow strap. Initiated postural education. Patient notes reduction in pain with postural correction.  No overall change in symptoms post evaluation. Patient appreciative of the assistance in her care    Patient was instructed in and issued a HEP for: scap retraction, horizontal abduction with band    Charges: PT Eval Low Complexity, TheEx      Total Timed Treatment: 10 min     Total Treatment Time: 45 min     Based on clinical rationale and outcome measures, this evaluation involved Low Complexity decision making   PLAN OF CARE:    Goals: (to be met in 8 visits)    left UE 40 pounds - no pain  Patient will be able to perform ADLS with no difficulty   Patient will be able to lift/carry minimum of 20 pounds no pain       Frequency / Duration: Patient will be seen for 2 x/week or a total of 8 visits over a 90 day period. Treatment  will include: Manual Therapy, therapeutic exercise     Education or treatment limitation: None  Rehab Potential:good    QuickDASH Outcome Score  Score: 65.91 % (2/12/2024 10:08 PM)      Patient/Family/Caregiver was advised of these findings, precautions, and treatment options and has agreed to actively participate in planning and for this course of care.    Thank you for your referral. Please co-sign or sign and return this letter via fax as soon as possible to [phone]. If you have any questions, please contact me at Dept: [phone]    Sincerely,  Electronically signed by therapist: Ema Rodriguez, CAM  Physician's certification required: Yes  I certify the need for these services furnished under this plan of treatment and while under my care.    X___________________________________________________ Date____________________    Certification From: 2/19/2024  To:5/19/2024

## 2024-02-26 ENCOUNTER — OFFICE VISIT (OUTPATIENT)
Dept: PHYSICAL THERAPY | Age: 39
End: 2024-02-26
Attending: EMERGENCY MEDICINE
Payer: COMMERCIAL

## 2024-02-26 PROCEDURE — 97110 THERAPEUTIC EXERCISES: CPT | Performed by: PHYSICAL THERAPIST

## 2024-02-26 NOTE — PROGRESS NOTES
Dx: Left tennis elbow (M77.12)            Authorized # of Visits:  8  Fall Risk: standard         Precautions: n/a             Subjective:   Left elbow pain. Patient states she does notice some improvement in pain with postural correction when she is doing dishes   Current Pain Ratin-7/10  Objective:   Left elbow pain provoked with movement from elbow extension to flexion   Cervical extension at times decreased pain when combined with elbow motion   Quadruped  hip hinge/ spinal flexion - no clear difference     Assessment:   Patient continues to have left elbow pain that is associated with left elbow motion as well as gripping. Will continue to monitor for cervical component to pain     Plan:   PT 2x/wk manual therapy, therapeutic exercise     Date: 2024  Tx#:  Date:   Tx#: 3/ Date:   Tx#: 4/ Date:   Tx#: 5/ Date:   Tx#: 6/ Date:   Tx#: 7/ Date:   Tx#: 8/   TherEx 45 min TherEx TherEx TherEx TherEx TherEx TherEx   Elliptical 4 minutes         Closed chain hip IR, forward step same side bilat reach 10 reps each, braiding          SL thoracic mobility 10 reps each          Prone MT 20 reps 2-3 second hold         Prone LT 15  reps each         Quadruped rock with flexion/hip hinge          Quadruped UE reach 3 reps each - no pain          Forearm wrist extensor stretch          Cervical PROM - no change in symptoms, SLR, LTR - no change               Charges: TherEx 3        Total Timed Treatment: 45 min  Total Treatment Time: 45 min

## 2024-02-29 ENCOUNTER — APPOINTMENT (OUTPATIENT)
Dept: PHYSICAL THERAPY | Age: 39
End: 2024-02-29
Attending: EMERGENCY MEDICINE
Payer: COMMERCIAL

## 2024-03-01 ENCOUNTER — APPOINTMENT (OUTPATIENT)
Dept: PHYSICAL THERAPY | Age: 39
End: 2024-03-01
Attending: EMERGENCY MEDICINE
Payer: COMMERCIAL

## 2024-03-05 ENCOUNTER — OFFICE VISIT (OUTPATIENT)
Dept: PHYSICAL THERAPY | Age: 39
End: 2024-03-05
Attending: EMERGENCY MEDICINE
Payer: COMMERCIAL

## 2024-03-05 PROCEDURE — 97110 THERAPEUTIC EXERCISES: CPT | Performed by: PHYSICAL THERAPIST

## 2024-03-05 PROCEDURE — 97140 MANUAL THERAPY 1/> REGIONS: CPT | Performed by: PHYSICAL THERAPIST

## 2024-03-05 NOTE — PROGRESS NOTES
Dx: Left tennis elbow (M77.12)            Authorized # of Visits:  8  Fall Risk: standard         Precautions: n/a             Subjective:   Reports she did get an elbow brace, there is reduced pain when she wears the brace   Current Pain Ratin/10  Objective:   Left elbow pain provoked with gripping, elbow eccentric flexion, wrist flexion combined with     Reduced pain with cues for relaxation of wrist/ and during elbow extension     Assessment:   There is some decrease in pain with cues for relaxation. There is difficulty with initiating motion after static hold in elbow flex or extension.  No change in pain with cervical ROM.     Plan:   PT 2x/wk manual therapy, therapeutic exercise     Date: 2024  Tx#:  Date: 3/5/2024   Tx#: 3/8 Date:   Tx#: 4/ Date:   Tx#: / Date:   Tx#: 6/ Date:   Tx#: 7/ Date:   Tx#: 8/   TherEx 45 min TherEx 30 min TherEx TherEx TherEx TherEx TherEx   Elliptical 4 minutes UBE 6 minutes        Closed chain hip IR, forward step same side bilat reach 10 reps each, braiding  Bilat pulldown 60 pounds 10 reps x 2         SL thoracic mobility 10 reps each  High pull 48 pounds 10 reps x 3 each        Prone MT 20 reps 2-3 second hold TRX retraction 10 reps x 2         Prone LT 15  reps each Yellow flex bar flexion 10 reps - mild elbow pain        Quadruped rock with flexion/hip hinge  Gripping, flex bar left wrist extension - pain,         Quadruped UE reach 3 reps each - no pain  Pec stretch 20 seconds x 4 bilat        Forearm wrist extensor stretch  Manual therapy: 10 min  Left UE long axis distraction, soft tissue release wrist extensors, A/P mob interosseous rad/ulna tissue         Cervical PROM - no change in symptoms, SLR, LTR - no change               Charges: TherEx 2, manual therapy        Total Timed Treatment: 40 min  Total Treatment Time: 40 min

## 2024-03-06 ENCOUNTER — EXTERNAL RECORD (OUTPATIENT)
Dept: HEALTH INFORMATION MANAGEMENT | Facility: OTHER | Age: 39
End: 2024-03-06

## 2024-03-06 ENCOUNTER — APPOINTMENT (OUTPATIENT)
Dept: DERMATOLOGY | Age: 39
End: 2024-03-06

## 2024-03-06 DIAGNOSIS — B07.9 VERRUCA VULGARIS: Primary | ICD-10-CM

## 2024-03-06 DIAGNOSIS — L91.8 INFLAMED SKIN TAG: ICD-10-CM

## 2024-03-06 PROCEDURE — 99203 OFFICE O/P NEW LOW 30 MIN: CPT | Performed by: DERMATOLOGY

## 2024-03-06 PROCEDURE — 11102 TANGNTL BX SKIN SINGLE LES: CPT | Performed by: DERMATOLOGY

## 2024-03-06 PROCEDURE — 11200 RMVL SKIN TAGS UP TO&INC 15: CPT | Performed by: DERMATOLOGY

## 2024-03-07 ENCOUNTER — OFFICE VISIT (OUTPATIENT)
Dept: PHYSICAL THERAPY | Age: 39
End: 2024-03-07
Attending: EMERGENCY MEDICINE
Payer: COMMERCIAL

## 2024-03-07 PROCEDURE — 97140 MANUAL THERAPY 1/> REGIONS: CPT | Performed by: PHYSICAL THERAPIST

## 2024-03-07 PROCEDURE — 97110 THERAPEUTIC EXERCISES: CPT | Performed by: PHYSICAL THERAPIST

## 2024-03-07 NOTE — PROGRESS NOTES
Dx: Left tennis elbow (M77.12)            Authorized # of Visits:  8  Fall Risk: standard         Precautions: n/a             Subjective:   Patient states she is better  Current Pain Rating: 3/10  Objective:   HEP additions as tolerated   Pain with gripping decreased compared with last visit  No pain with WB left UE    Assessment:   Tolerance for exercise much improved compared to previous visit. Eccentric motion at elbow - decreased pain. Discussed progress with patient. Several HEP additions made. Recommended minimum of one more visit to ensure understanding of latest HEP additions and re-eval.  Plan:   Re-eval. Determine if additional PT is indicated     Date: 2/26/2024  Tx#: 2/8 Date: 3/5/2024   Tx#: 3/8 Date: 3/7/2024   Tx#: 4/8 Date:   Tx#: 5/ Date:   Tx#: 6/ Date:   Tx#: 7/ Date:   Tx#: 8/   TherEx 45 min TherEx 30 min TherEx 35 TherEx TherEx TherEx TherEx   Elliptical 4 minutes UBE 6 minutes UBE 6 minutes        Closed chain hip IR, forward step same side bilat reach 10 reps each, braiding  Bilat pulldown 60 pounds 10 reps x 2  Digiflex 12 reps  red       SL thoracic mobility 10 reps each  High pull 48 pounds 10 reps x 3 each Yellow flex bar wrist flex/exten 10 reps each       Prone MT 20 reps 2-3 second hold TRX retraction 10 reps x 2  D2 flexion red band (HEP)       Prone LT 15  reps each Yellow flex bar flexion 10 reps - mild elbow pain Triceps (HEP)  Red band       Quadruped rock with flexion/hip hinge  Gripping, flex bar left wrist extension - pain,  Biceps (HEP)red band        Quadruped UE reach 3 reps each - no pain  Pec stretch 20 seconds x 4 bilat Wall push ups 10 reps x 2        Forearm wrist extensor stretch  Manual therapy: 10 min  Left UE long axis distraction, soft tissue release wrist extensors, A/P mob interosseous rad/ulna tissue  Doorway pec stretch (HEP)       Cervical PROM - no change in symptoms, SLR, LTR - no change    Manual therapy: 10 min  Left UE long axis distraction, soft  tissue release wrist extensors, A/P mob interosseous rad/ulna tissue, gapping left elbow - goo reduction of pain           Charges: TherEx 2, manual therapy        Total Timed Treatment: 45 min  Total Treatment Time: 45 min

## 2024-03-08 LAB
ASR DISCLAIMER: NORMAL
CASE RPRT: NORMAL
CLINICAL INFO: NORMAL
PATH REPORT.FINAL DX SPEC: NORMAL
PATH REPORT.GROSS SPEC: NORMAL

## 2024-03-20 ENCOUNTER — OFFICE VISIT (OUTPATIENT)
Dept: PHYSICAL THERAPY | Age: 39
End: 2024-03-20
Attending: EMERGENCY MEDICINE
Payer: COMMERCIAL

## 2024-03-20 PROCEDURE — 97530 THERAPEUTIC ACTIVITIES: CPT | Performed by: PHYSICAL THERAPIST

## 2024-03-20 PROCEDURE — 97110 THERAPEUTIC EXERCISES: CPT | Performed by: PHYSICAL THERAPIST

## 2024-03-20 NOTE — PROGRESS NOTES
Dx: Left tennis elbow (M77.12)            Authorized # of Visits:  8  Fall Risk: standard         Precautions: n/a            Discharge Summary  Pt has attended 4 visits in Physical Therapy.    Subjective:   Patient states she is better. She feels she is able to go on her own. She does notice more pain in the morning. There is no neck pain she does sleep on her stomach  Current Pain Rating: 3/10  Objective:   Cervical AROM/PROM WNL. Spurling's negative   Bilateral UE ROM WNL. Left elbow pain provoked in supine with elbow flex/exten - no support at cervical spine. Positive neural tension in supine when cervical lordosis is not supported   10 pound suitcase carry left UE provoked left UE symptoms - lateral spine shift. With bilateral carry - no pain   : left UE 40 pounds mild pain. Resisted supination - 4/5 strength - pain. Gross shoulder strength 5/5   No acute pain with palpation. Pain is located at left elbow and wrist extensors       Assessment:   Patient is making progress towards therapy goals. Pain is transient, although pain was not impacted with cervical ROM. There does appear to be a cervical component to left elbow pain as support at cervical lordosis reduces pain with left elbow motion. In addition, there slight cervical shift with UE carrying. Posture/body mechanics have been reviewed with the patient with increased emphasis during final visit.  Patient was able to appreciate the improvement in pain with support at cervical spine.        Goals: (to be met in 8 visits)    left UE 40 pounds - no pain - progressing   Patient will be able to perform ADLS with no difficulty - progressing  Patient will be able to lift/carry minimum of 20 pounds no pain - Not Met    Post QuickDASH Outcome Score  Post Score: 31.82 % (3/20/2024 11:10 AM)    34.09 % improvement    Plan: Discontinue PT. Patient to continue with HEP. She will contact physician if there is exacerbation of symptoms       Thank you for your  referral. If you have any questions, please contact me at Dept: [phone].    Sincerely,  Electronically signed by therapist: Ema Rodriguez PT     Physician's certification required:  No  Please co-sign or sign and return this letter via fax as soon as possible to [phone].   I certify the need for these services furnished under this plan of treatment and while under my care.    X___________________________________________________ Date____________________    Certification From: 3/20/2024  To:6/18/2024    Date: 2/26/2024  Tx#: 2/8 Date: 3/5/2024   Tx#: 3/8 Date: 3/7/2024   Tx#: 4/8 Date: 3/20/2024   Tx#: 5/8 Date:   Tx#: 6/ Date:   Tx#: 7/ Date:   Tx#: 8/   TherEx 45 min TherEx 30 min TherEx 35 TherEx TherEx TherEx TherEx   Elliptical 4 minutes UBE 6 minutes UBE 6 minutes  UBE 6 min      Closed chain hip IR, forward step same side bilat reach 10 reps each, braiding  Bilat pulldown 60 pounds 10 reps x 2  Digiflex 12 reps  red 10 pound suitcase carry - left UE symptoms mild lateral spinal shift, bilat carry - no symptoms       SL thoracic mobility 10 reps each  High pull 48 pounds 10 reps x 3 each Yellow flex bar wrist flex/exten 10 reps each Prone MT 20 reps left elbow pain with increased wrist extension and flexion       Prone MT 20 reps 2-3 second hold TRX retraction 10 reps x 2  D2 flexion red band (HEP) Cervical isometrics - good       Prone LT 15  reps each Yellow flex bar flexion 10 reps - mild elbow pain Triceps (HEP)  Red band Cervical PROM       Quadruped rock with flexion/hip hinge  Gripping, flex bar left wrist extension - pain,  Biceps (HEP)red band  Therapeutic activities: review of posture, anatomy       Quadruped UE reach 3 reps each - no pain  Pec stretch 20 seconds x 4 bilat Wall push ups 10 reps x 2        Forearm wrist extensor stretch  Manual therapy: 10 min  Left UE long axis distraction, soft tissue release wrist extensors, A/P mob interosseous rad/ulna tissue  Doorway pec stretch (HEP)        Cervical PROM - no change in symptoms, SLR, LTR - no change    Manual therapy: 10 min  Left UE long axis distraction, soft tissue release wrist extensors, A/P mob interosseous rad/ulna tissue, gapping left elbow - goo reduction of pain           Charges: TherEx 2, therapeutic activities        Total Timed Treatment: 45 min  Total Treatment Time: 45 min

## 2024-05-15 ENCOUNTER — APPOINTMENT (OUTPATIENT)
Dept: DERMATOLOGY | Age: 39
End: 2024-05-15

## 2024-05-15 DIAGNOSIS — D36.10 NEUROFIBROMA: Primary | ICD-10-CM

## 2024-06-25 ENCOUNTER — OFFICE VISIT (OUTPATIENT)
Dept: FAMILY MEDICINE CLINIC | Facility: CLINIC | Age: 39
End: 2024-06-25

## 2024-06-25 VITALS
WEIGHT: 145 LBS | DIASTOLIC BLOOD PRESSURE: 78 MMHG | HEART RATE: 89 BPM | OXYGEN SATURATION: 98 % | BODY MASS INDEX: 28.47 KG/M2 | RESPIRATION RATE: 18 BRPM | HEIGHT: 60 IN | TEMPERATURE: 97 F | SYSTOLIC BLOOD PRESSURE: 122 MMHG

## 2024-06-25 DIAGNOSIS — J06.9 UPPER RESPIRATORY TRACT INFECTION, UNSPECIFIED TYPE: Primary | ICD-10-CM

## 2024-06-25 LAB
CONTROL LINE PRESENT WITH A CLEAR BACKGROUND (YES/NO): YES YES/NO
KIT LOT #: NORMAL NUMERIC

## 2024-06-25 PROCEDURE — 87880 STREP A ASSAY W/OPTIC: CPT | Performed by: PHYSICIAN ASSISTANT

## 2024-06-25 PROCEDURE — 3074F SYST BP LT 130 MM HG: CPT | Performed by: PHYSICIAN ASSISTANT

## 2024-06-25 PROCEDURE — 99213 OFFICE O/P EST LOW 20 MIN: CPT | Performed by: PHYSICIAN ASSISTANT

## 2024-06-25 PROCEDURE — 3078F DIAST BP <80 MM HG: CPT | Performed by: PHYSICIAN ASSISTANT

## 2024-06-25 PROCEDURE — 3008F BODY MASS INDEX DOCD: CPT | Performed by: PHYSICIAN ASSISTANT

## 2024-06-25 NOTE — PROGRESS NOTES
CHIEF COMPLAINT:     Chief Complaint   Patient presents with    Sore Throat     Sore throat. For 4 days   Pt was sick last week with fever ( pt didn't took temp ) & cough   No exposure        HPI:   Willow Hernandez is a 38 year old female who presents with complaints of feeling sick for one week.  Symptoms began with nasal congestion, nasal drainage, subjective fevers, sore throat, and cough.  The patient did not take her temperature with a thermometer.  The patient denies recent fevers.  The patient reports her nasal congestion and nasal drainage has improved.  The patient reports her throat is still sore and she is still coughing.  She denies ear pain, CP, SOB, wheezing, abdominal pain, vomiting, diarrhea, and rash.  The patient is tolerating po.     No current outpatient medications on file.      History reviewed. No pertinent past medical history.   Social History:  Social History     Socioeconomic History    Marital status:    Tobacco Use    Smoking status: Never    Smokeless tobacco: Never   Vaping Use    Vaping status: Never Used   Substance and Sexual Activity    Alcohol use: No    Drug use: No        REVIEW OF SYSTEMS:   GENERAL: See HPI  SKIN: Denies rashes, skin wounds or ulcers.  EYES: Denies blurred vision or double vision  HENT: See HPI  CHEST: Denies chest pain, or palpitations  LUNGS: See HPI  GI: Denies abdominal pain, N/V/C/D.   MUSCULOSKELETAL: no arthralgia or swollen joints  LYMPH:  Denies lymphadenopathy  NEURO: Denies headaches or lightheadedness      EXAM:   /78   Pulse 89   Temp 97.2 °F (36.2 °C) (Temporal)   Resp 18   Ht 5' (1.524 m)   Wt 145 lb (65.8 kg)   LMP 06/21/2024 (Approximate)   SpO2 98%   BMI 28.32 kg/m²   GENERAL: well developed, well nourished,in no apparent distress, cooperative   SKIN: no rashes, nosuspicious lesions, no abnormal pigmentation  HEAD: atraumatic, normocephalic  EYES: EOM intact, PERRL.  Conjunctiva normal.  Cornea clear.  Lid margins  normal.  No active drainage.  EARS: Right TM normal, no bulging, no retraction, no fluid, bony landmarks normal.  Left TM normal, no bulging, no retraction, no fluid, bony landmarks normal.    NOSE: nostrils patent, no discharge, nasal mucosa pink and not inflamed.  No sinus tenderness.  THROAT: oral mucosa pink, moist and intact. No visible dental caries. Posterior pharynx without erythema or exudates.  +PND  NECK: supple, non-tender.  LUNGS: clear to auscultation bilaterally, no wheezes or rhonchi. Breathing is non labored.  CARDIO: RRR without murmur  GI: No visible scars, or masses. BS's present x4. No palpable masses or hepatosplenomegaly.  Non tender.  No guarding or rebound tenderness  EXTREMITIES: no cyanosis, clubbing or edema.  Homans NEG.  Dorsalis Pedis 2+.  LYMPH:  No lymphadenopathy.    NEURO: A&Ox3.  CN II-XII intact.  No focal deficits.  Coordination and Gait normal.  Kernig and Brudzinski's are negative.    Rapid Strep is NEG        ASSESSMENT AND PLAN:     ASSESSMENT:  Encounter Diagnosis   Name Primary?    Upper respiratory tract infection, unspecified type Yes       PLAN:    Patient Instructions   Claritin  OTC pain relief  Follow up with PCP   If worse seek treatment

## (undated) NOTE — LETTER
Date: 12/8/2021    Patient Name: Loulou Trejo          To Whom it may concern: This letter has been written at the patient's request. The above patient was seen at the [facility] for treatment of a medical condition.     Covid [employer]